# Patient Record
Sex: MALE | Race: WHITE | Employment: OTHER | URBAN - METROPOLITAN AREA
[De-identification: names, ages, dates, MRNs, and addresses within clinical notes are randomized per-mention and may not be internally consistent; named-entity substitution may affect disease eponyms.]

---

## 2018-10-17 ENCOUNTER — HOSPITAL ENCOUNTER (OUTPATIENT)
Dept: PREADMISSION TESTING | Age: 63
Discharge: HOME | End: 2018-10-17
Payer: COMMERCIAL

## 2018-10-17 ENCOUNTER — HOSPITAL ENCOUNTER (OUTPATIENT)
Dept: GENERAL RADIOLOGY | Age: 63
Discharge: HOME | End: 2018-10-17
Payer: COMMERCIAL

## 2018-10-17 VITALS — WEIGHT: 260 LBS | BODY MASS INDEX: 35.21 KG/M2 | HEIGHT: 72 IN

## 2018-10-17 LAB
ALBUMIN SERPL-MCNC: 3.9 G/DL (ref 3.4–5)
ALBUMIN/GLOB SERPL: 1.1 {RATIO} (ref 0.8–1.7)
ALP SERPL-CCNC: 77 U/L (ref 45–117)
ALT SERPL-CCNC: 30 U/L (ref 16–61)
ANION GAP SERPL CALC-SCNC: 12 MMOL/L (ref 3–18)
APPEARANCE UR: CLEAR
AST SERPL-CCNC: 17 U/L (ref 15–37)
BILIRUB SERPL-MCNC: 0.5 MG/DL (ref 0.2–1)
BILIRUB UR QL: NEGATIVE
BUN SERPL-MCNC: 27 MG/DL (ref 7–18)
BUN/CREAT SERPL: 25 (ref 12–20)
CALCIUM SERPL-MCNC: 9.4 MG/DL (ref 8.5–10.1)
CHLORIDE SERPL-SCNC: 101 MMOL/L (ref 100–108)
CO2 SERPL-SCNC: 24 MMOL/L (ref 21–32)
COLOR UR: YELLOW
CREAT SERPL-MCNC: 1.1 MG/DL (ref 0.6–1.3)
ERYTHROCYTE [DISTWIDTH] IN BLOOD BY AUTOMATED COUNT: 14.5 % (ref 11.6–14.5)
GLOBULIN SER CALC-MCNC: 3.4 G/DL (ref 2–4)
GLUCOSE SERPL-MCNC: 91 MG/DL (ref 74–99)
GLUCOSE UR STRIP.AUTO-MCNC: NEGATIVE MG/DL
HCT VFR BLD AUTO: 43.4 % (ref 36–48)
HGB BLD-MCNC: 14.8 G/DL (ref 13–16)
HGB UR QL STRIP: NEGATIVE
KETONES UR QL STRIP.AUTO: ABNORMAL MG/DL
LEUKOCYTE ESTERASE UR QL STRIP.AUTO: NEGATIVE
MCH RBC QN AUTO: 29.4 PG (ref 24–34)
MCHC RBC AUTO-ENTMCNC: 34.1 G/DL (ref 31–37)
MCV RBC AUTO: 86.3 FL (ref 74–97)
NITRITE UR QL STRIP.AUTO: NEGATIVE
PH UR STRIP: 5 [PH] (ref 5–8)
PLATELET # BLD AUTO: 301 K/UL (ref 135–420)
PMV BLD AUTO: 9.8 FL (ref 9.2–11.8)
POTASSIUM SERPL-SCNC: 4.2 MMOL/L (ref 3.5–5.5)
PROT SERPL-MCNC: 7.3 G/DL (ref 6.4–8.2)
PROT UR STRIP-MCNC: NEGATIVE MG/DL
RBC # BLD AUTO: 5.03 M/UL (ref 4.7–5.5)
SODIUM SERPL-SCNC: 137 MMOL/L (ref 136–145)
SP GR UR REFRACTOMETRY: 1.03 (ref 1–1.03)
UROBILINOGEN UR QL STRIP.AUTO: 0.2 EU/DL (ref 0.2–1)
WBC # BLD AUTO: 8.6 K/UL (ref 4.6–13.2)

## 2018-10-17 PROCEDURE — 85027 COMPLETE CBC AUTOMATED: CPT | Performed by: ORTHOPAEDIC SURGERY

## 2018-10-17 PROCEDURE — 71045 X-RAY EXAM CHEST 1 VIEW: CPT

## 2018-10-17 PROCEDURE — 93005 ELECTROCARDIOGRAM TRACING: CPT

## 2018-10-17 PROCEDURE — 80053 COMPREHEN METABOLIC PANEL: CPT | Performed by: ORTHOPAEDIC SURGERY

## 2018-10-17 PROCEDURE — 81003 URINALYSIS AUTO W/O SCOPE: CPT | Performed by: ORTHOPAEDIC SURGERY

## 2018-10-17 RX ORDER — CITALOPRAM 40 MG/1
40 TABLET, FILM COATED ORAL DAILY
COMMUNITY

## 2018-10-17 RX ORDER — SODIUM CHLORIDE, SODIUM LACTATE, POTASSIUM CHLORIDE, CALCIUM CHLORIDE 600; 310; 30; 20 MG/100ML; MG/100ML; MG/100ML; MG/100ML
125 INJECTION, SOLUTION INTRAVENOUS CONTINUOUS
Status: CANCELLED | OUTPATIENT
Start: 2018-10-26

## 2018-10-17 RX ORDER — LIDOCAINE 50 MG/G
1 PATCH TOPICAL AS NEEDED
COMMUNITY

## 2018-10-17 RX ORDER — ACETAMINOPHEN 325 MG/1
650 TABLET ORAL
COMMUNITY

## 2018-10-17 RX ORDER — OLMESARTAN MEDOXOMIL, AMLODIPINE AND HYDROCHLOROTHIAZIDE TABLET 40/5/25 MG 40; 5; 25 MG/1; MG/1; MG/1
1 TABLET ORAL DAILY
COMMUNITY

## 2018-10-17 RX ORDER — MONTELUKAST SODIUM 10 MG/1
10 TABLET ORAL DAILY
COMMUNITY

## 2018-10-17 RX ORDER — TRAZODONE HYDROCHLORIDE 50 MG/1
50 TABLET ORAL AS NEEDED
COMMUNITY

## 2018-10-17 RX ORDER — RIZATRIPTAN BENZOATE 10 MG/1
10 TABLET ORAL
COMMUNITY

## 2018-10-17 RX ORDER — PREGABALIN 150 MG/1
150 CAPSULE ORAL 2 TIMES DAILY
COMMUNITY

## 2018-10-17 RX ORDER — ONDANSETRON 4 MG/1
4 TABLET, ORALLY DISINTEGRATING ORAL
COMMUNITY

## 2018-10-17 RX ORDER — TIZANIDINE 4 MG/1
4 TABLET ORAL
COMMUNITY

## 2018-10-17 RX ORDER — TRIAMCINOLONE ACETONIDE 55 UG/1
2 SPRAY, METERED NASAL DAILY
COMMUNITY

## 2018-10-17 RX ORDER — MINERAL OIL
180 ENEMA (ML) RECTAL DAILY
COMMUNITY

## 2018-10-17 NOTE — PERIOP NOTES
Denies sleep apnea or any complications with anesthesia joselito prep reviewed does not meet criteria for special population

## 2018-10-20 LAB
ATRIAL RATE: 85 BPM
CALCULATED P AXIS, ECG09: 66 DEGREES
CALCULATED R AXIS, ECG10: 50 DEGREES
CALCULATED T AXIS, ECG11: 41 DEGREES
DIAGNOSIS, 93000: NORMAL
P-R INTERVAL, ECG05: 162 MS
Q-T INTERVAL, ECG07: 362 MS
QRS DURATION, ECG06: 80 MS
QTC CALCULATION (BEZET), ECG08: 430 MS
VENTRICULAR RATE, ECG03: 85 BPM

## 2018-10-24 NOTE — PERIOP NOTES
Spoke with Butch Covarrubias at Dr Shorty Summers office, she will inform Viji Lyons of CXR results.

## 2018-10-25 ENCOUNTER — ANESTHESIA EVENT (OUTPATIENT)
Dept: SURGERY | Age: 63
End: 2018-10-25
Payer: COMMERCIAL

## 2018-10-25 NOTE — H&P
Patient Name:  Marisa Johnson  [facility]) YOB: 1955 Chief Complaint:  Left knee pain. History of Chief Complaint:  Lon Acevedo comes in today for evaluation of his left knee pain. He was previously evaluated and noted to have focal symptoms in the lateral aspect of the knee and difficulty with motion with flexion and extension and recurrent mechanical complaints at the knee. He is noted to have severe arthritic change in the left hip that affects his ambulation and stability of his lower extremity as well but upon last evaluation he was concerned about these findings and we had recommended continuing plans for further evaluation of the knee and had set up MRI. Today he comes in for followup and he continues to focus his pain to the lateral aspect of the knee with motion and rotation. His symptoms seem to be intermittent but when it catches, it is difficult for him to put any significant weight on the knee, limiting his ambulation and his activity in general. 
 
Past Medical/Surgical History:   
Disease/Disorder Type Date Side Surgery Date Side Comment Adenoidectomy Tonsillectomy Allergies, environmental         
Hypertension Arthroscopy knee 03/05/2014 left Gastric hemorrhage due to Dieulafoy lesion of stomach    Repair 09/2015 Headache, migraine  1999 Depression  2005 Allergies:   
Ingredient Reaction Medication Name Comment NSAIDS (NON-STEROIDAL ANTI-INFLAMMATORY DRUG) Current Medications:   
Medication Directions Maxalt 10 mg tablet PRN Singulair 10 mg tablet take 1 tablet by oral route  every day Allegra Allergy   
ondansetron HCl 4 mg tablet take 2 tablet by oral route 2 times every day Chlor-Trimeton 4 mg tablet take 1 tablet by oral route  every 4 hours as needed  
bupropion HCl  mg tablet,12 hr sustained-release   
citalopram 40 mg tablet   
hydrocodone 10 mg-acetaminophen 325 mg tablet olmesartan 40 mg-amlodipine 10 mg-hydrochlorothiazide 25 mg tablet Lidoderm 5 % topical patch APPLY 1 PATCH BY TRANSDERMAL ROUTE EVERY DAY (MAY WEAR UP TO 12HOURS.)  
tizanidine 4 mg tablet take 1 tablet by oral route  every 8 hours as needed not to exceed 3 doses in 24 hours Lorzone 750 mg tablet take 1 tablet by oral route 2 times every day and Tizanidine at bedtime. Do not take within 8 hours of the tizanidine Lyrica 150 mg capsule Take 1 tablet by oral route three times daily Social History: SMOKING Status Tobacco Type Units Per Day Yrs Used Never smoker ALCOHOL There is a history of alcohol use. consumed occasionally. Family History:   
Disease Detail Family Member Age Cause of Death Comments No relevant family history Review of Systems:    Pertinent positives include joint pain and joint stiffness. Pertinent negatives include chills, fever and numbness/tingling. Vitals: 
Date BP Pulse Temp (F) Resp. (per min.) Height (Total in.) Weight (lbs.) BMI  
08/30/2018 172/82 94   72.00  36.62 Physical Examination: Psychiatric/General:  No acute distress; pleasant and accommodating. HEENT:  Moist mucous membranes intact. Lymphatic:  Neck is supple with no lymphadenopathy upon evaluation. Respiratory:   Equal bilateral chest wall movement with inspiration; no wheezes or strider audible. Cardiovascular:    Regular rate and rhythm, as noted by upper extremity pulses. Musculoskeletal: On evaluation of the left knee, he is able to flex and extend with tenderness upon palpation, diffuse, mostly focused to the lateral aspect of the knee and the lateral joint line with reproduced symptoms and positive Mahin's focused to the lateral aspect of the joint. Gross motor is intact throughout the lower extremities. Skin is without ulceration or lesion. Standing and evaluation of gait is antalgic on the lateral aspect as well as with poor progression.   With forward flexion he has reproduced symptoms in the anterior portion of the groin with flexion, extension and rotation. Data/Radiograph Evaluation:    MRI is reviewed and notes underlying arthritic change, mostly patellofemoral and mild changes in the medial and lateral compartments, very significant lateral meniscal tear and also noted. Assessment:   Lower extremity complaints with focal symptoms in the lateral joint line which correlates with findings on MRI. He does have significant arthrosis of his hip which undoubtedly can give him knee pain as well but at this point. Recommendation:   I think we will focus on the knee to improve his function so we can move forward with additional intervention of the hip in the future if necessary. The risks and benefits were reviewed. We will continue with plans to schedule arthroscopy of the left knee.          
 
Alvaro Chun, DO

## 2018-10-25 NOTE — DISCHARGE INSTRUCTIONS
[facility]  Dr. Darshan Mallory Post-Operative Instructions Knee Arthroscopy Scope    Diet:  1. Begin with liquids and light foods such as Jell-O and soups. 2. Advance as tolerated to your regular diet if not nauseated. First 24 hours:  1. Be in the care of a responsible adult. 2. Do not drive or operate machinery. 3. Do not drink alcoholic beverages. Activities:  1. Elevate the limb above hip and preferably above chest for 48 hours. 2. Ice should be applied to the knee in a waterproof bag for 15-30 minutes each hour while awake for first 48 hours. 3. Normal walking is encouraged after 2 days. 4. Do not engage in activities that increase your pain such as stair-climbing or prolonged standing. 5. Return to work depends on your type of employment. 6.  Follow-up with Dr. Marian Frausto in 7-10 days post-operatively. Exercise:  1. Begin exercises the day of surgery for both legs and repeat hourly while awake:  *Quad sets (tightening the thigh muscles)  *? Straight leg raises (lift and hold 12-18 off bed or floor for 8 count)  *? Vigorous ankle pumps (toes towards and away from head)  2. Your routine exercises generally can be started one week after surgery as long as you can bend the knee freely to at least 90 degrees. Wound Care:  1. Maintain your postoperative dressing. Loosen the ACE wrap if swelling of the foot or ankle occurs. 2. Remove your surgical dressing on the second post op day. Cover the wounds with Band-Aids and re-wrap the ACE bandage until swelling of knee is gone. To maintain good circulation, do not wrap too tightly. 3. Keep the surgical incisions dry until your sutures are removed when you see your doctor. Use a plastic bag with rubber bands to cover the limb during showers. Immersing the limb in water is to be avoided. Medications:  1. Strong oral narcotic pain medications have been prescribed for the first few days. Use only as directed.  No pain medication is capable of taking away all the pain. Taking your pills at regular intervals will give you the best chance of having less pain. 2. If you need a refill PLEASE PLAN AHEAD. Call our office during regular hours (8-5). 3. Do not combine with alcoholic beverages. 4. Be careful as you walk, climb stairs or drive as mild dizziness is not unusual.  5. Do not take medications that have not been prescribed by your surgeon. 6. You may switch to over the counter pain medication of your choice as you become more comfortable. WHEN TO CALL YOUR SURGEON:  1. Significant swelling or any new numbness in the limb that was operated on  2. Unrelenting pain  3. Fever or Chills  4. Redness around incisions  5. Color change in foot or toes  6. Continuous drainage or bleeding from wounds (a small amount of drainage is expected)  7. Any other worrisome condition    WHEN TO CALL YOUR REGULAR DOCTOR:  1. Flare up of any of your regular medical conditions    WHEN TO CALL 911:  1. Chest Pain  2. Shortness of Breath  3. Any other acute serious condition    CALL THE OFFICE:   If you have severe pain unrelieved by the medications;   If you have a fever of 101.0°F or greater;    If you notice excessive swelling, redness, or persistent drainage from the incision or IV site; [facility] office number is [phone] from 8:00am to 5:00pm Monday through Friday. After 5:00pm, on weekends, or holidays, please leave a message with our answering service and the doctor on-call will get back to you shortly. Cira Rogers

## 2018-10-26 ENCOUNTER — ANESTHESIA (OUTPATIENT)
Dept: SURGERY | Age: 63
End: 2018-10-26
Payer: COMMERCIAL

## 2018-10-26 ENCOUNTER — HOSPITAL ENCOUNTER (OUTPATIENT)
Age: 63
Setting detail: OUTPATIENT SURGERY
Discharge: HOME | End: 2018-10-26
Attending: ORTHOPAEDIC SURGERY | Admitting: ORTHOPAEDIC SURGERY
Payer: COMMERCIAL

## 2018-10-26 VITALS
BODY MASS INDEX: 36.99 KG/M2 | OXYGEN SATURATION: 95 % | RESPIRATION RATE: 18 BRPM | DIASTOLIC BLOOD PRESSURE: 65 MMHG | HEIGHT: 72 IN | TEMPERATURE: 98.3 F | HEART RATE: 80 BPM | WEIGHT: 273.06 LBS | SYSTOLIC BLOOD PRESSURE: 148 MMHG

## 2018-10-26 DIAGNOSIS — M23.322 MEDIAL MENISCUS, POSTERIOR HORN DERANGEMENT, LEFT: Primary | ICD-10-CM

## 2018-10-26 PROCEDURE — 77030018835 HC SOL IRR LR ICUM -A: Performed by: ORTHOPAEDIC SURGERY

## 2018-10-26 PROCEDURE — 77030036563 HC WRP CLD THER KNE S2SG -B: Performed by: ORTHOPAEDIC SURGERY

## 2018-10-26 PROCEDURE — 77030034478 HC TU IRR ARTHRO PT ARTH -B: Performed by: ORTHOPAEDIC SURGERY

## 2018-10-26 PROCEDURE — 77030020782 HC GWN BAIR PAWS FLX 3M -B: Performed by: ORTHOPAEDIC SURGERY

## 2018-10-26 PROCEDURE — 74011250636 HC RX REV CODE- 250/636: Performed by: ORTHOPAEDIC SURGERY

## 2018-10-26 PROCEDURE — 74011250637 HC RX REV CODE- 250/637: Performed by: ANESTHESIOLOGY

## 2018-10-26 PROCEDURE — 74011250636 HC RX REV CODE- 250/636

## 2018-10-26 PROCEDURE — 76210000026 HC REC RM PH II 1 TO 1.5 HR: Performed by: ORTHOPAEDIC SURGERY

## 2018-10-26 PROCEDURE — 77030022877 HC TU IRR ARTHRO PMP ARTH -B: Performed by: ORTHOPAEDIC SURGERY

## 2018-10-26 PROCEDURE — 74011000250 HC RX REV CODE- 250: Performed by: ORTHOPAEDIC SURGERY

## 2018-10-26 PROCEDURE — 77030006884 HC BLD SHV INCIS S&N -B: Performed by: ORTHOPAEDIC SURGERY

## 2018-10-26 PROCEDURE — 76210000006 HC OR PH I REC 0.5 TO 1 HR: Performed by: ORTHOPAEDIC SURGERY

## 2018-10-26 PROCEDURE — 76060000032 HC ANESTHESIA 0.5 TO 1 HR: Performed by: ORTHOPAEDIC SURGERY

## 2018-10-26 PROCEDURE — 77030012508 HC MSK AIRWY LMA AMBU -A: Performed by: ANESTHESIOLOGY

## 2018-10-26 PROCEDURE — 77030032490 HC SLV COMPR SCD KNE COVD -B: Performed by: ORTHOPAEDIC SURGERY

## 2018-10-26 PROCEDURE — 77030002933 HC SUT MCRYL J&J -A: Performed by: ORTHOPAEDIC SURGERY

## 2018-10-26 PROCEDURE — 76010000138 HC OR TIME 0.5 TO 1 HR: Performed by: ORTHOPAEDIC SURGERY

## 2018-10-26 PROCEDURE — 74011000250 HC RX REV CODE- 250

## 2018-10-26 RX ORDER — SODIUM CHLORIDE, SODIUM LACTATE, POTASSIUM CHLORIDE, CALCIUM CHLORIDE 600; 310; 30; 20 MG/100ML; MG/100ML; MG/100ML; MG/100ML
125 INJECTION, SOLUTION INTRAVENOUS CONTINUOUS
Status: DISCONTINUED | OUTPATIENT
Start: 2018-10-26 | End: 2018-10-26 | Stop reason: HOSPADM

## 2018-10-26 RX ORDER — HYDROCODONE BITARTRATE AND ACETAMINOPHEN 5; 325 MG/1; MG/1
1 TABLET ORAL AS NEEDED
Status: DISCONTINUED | OUTPATIENT
Start: 2018-10-26 | End: 2018-10-26 | Stop reason: HOSPADM

## 2018-10-26 RX ORDER — SODIUM CHLORIDE 0.9 % (FLUSH) 0.9 %
5-10 SYRINGE (ML) INJECTION AS NEEDED
Status: DISCONTINUED | OUTPATIENT
Start: 2018-10-26 | End: 2018-10-26 | Stop reason: HOSPADM

## 2018-10-26 RX ORDER — GLYCOPYRROLATE 0.2 MG/ML
INJECTION INTRAMUSCULAR; INTRAVENOUS AS NEEDED
Status: DISCONTINUED | OUTPATIENT
Start: 2018-10-26 | End: 2018-10-26 | Stop reason: HOSPADM

## 2018-10-26 RX ORDER — PROPOFOL 10 MG/ML
INJECTION, EMULSION INTRAVENOUS AS NEEDED
Status: DISCONTINUED | OUTPATIENT
Start: 2018-10-26 | End: 2018-10-26 | Stop reason: HOSPADM

## 2018-10-26 RX ORDER — KETOROLAC TROMETHAMINE 30 MG/ML
INJECTION, SOLUTION INTRAMUSCULAR; INTRAVENOUS AS NEEDED
Status: DISCONTINUED | OUTPATIENT
Start: 2018-10-26 | End: 2018-10-26 | Stop reason: HOSPADM

## 2018-10-26 RX ORDER — DEXAMETHASONE SODIUM PHOSPHATE 4 MG/ML
INJECTION, SOLUTION INTRA-ARTICULAR; INTRALESIONAL; INTRAMUSCULAR; INTRAVENOUS; SOFT TISSUE AS NEEDED
Status: DISCONTINUED | OUTPATIENT
Start: 2018-10-26 | End: 2018-10-26 | Stop reason: HOSPADM

## 2018-10-26 RX ORDER — ONDANSETRON 2 MG/ML
INJECTION INTRAMUSCULAR; INTRAVENOUS AS NEEDED
Status: DISCONTINUED | OUTPATIENT
Start: 2018-10-26 | End: 2018-10-26 | Stop reason: HOSPADM

## 2018-10-26 RX ORDER — HYDROCODONE BITARTRATE AND ACETAMINOPHEN 10; 325 MG/1; MG/1
1 TABLET ORAL
COMMUNITY

## 2018-10-26 RX ORDER — FENTANYL CITRATE 50 UG/ML
INJECTION, SOLUTION INTRAMUSCULAR; INTRAVENOUS AS NEEDED
Status: DISCONTINUED | OUTPATIENT
Start: 2018-10-26 | End: 2018-10-26 | Stop reason: HOSPADM

## 2018-10-26 RX ORDER — FLUMAZENIL 0.1 MG/ML
0.2 INJECTION INTRAVENOUS
Status: DISCONTINUED | OUTPATIENT
Start: 2018-10-26 | End: 2018-10-26 | Stop reason: HOSPADM

## 2018-10-26 RX ORDER — HYDROCODONE BITARTRATE AND ACETAMINOPHEN 5; 325 MG/1; MG/1
1 TABLET ORAL
Qty: 21 TAB | Refills: 0 | Status: SHIPPED | OUTPATIENT
Start: 2018-10-26

## 2018-10-26 RX ORDER — DIPHENHYDRAMINE HCL 25 MG
25 CAPSULE ORAL
COMMUNITY

## 2018-10-26 RX ORDER — MIDAZOLAM HYDROCHLORIDE 1 MG/ML
INJECTION, SOLUTION INTRAMUSCULAR; INTRAVENOUS AS NEEDED
Status: DISCONTINUED | OUTPATIENT
Start: 2018-10-26 | End: 2018-10-26 | Stop reason: HOSPADM

## 2018-10-26 RX ORDER — LIDOCAINE HYDROCHLORIDE 20 MG/ML
INJECTION, SOLUTION EPIDURAL; INFILTRATION; INTRACAUDAL; PERINEURAL AS NEEDED
Status: DISCONTINUED | OUTPATIENT
Start: 2018-10-26 | End: 2018-10-26 | Stop reason: HOSPADM

## 2018-10-26 RX ORDER — FENTANYL CITRATE 50 UG/ML
50 INJECTION, SOLUTION INTRAMUSCULAR; INTRAVENOUS
Status: DISCONTINUED | OUTPATIENT
Start: 2018-10-26 | End: 2018-10-26 | Stop reason: HOSPADM

## 2018-10-26 RX ORDER — NALOXONE HYDROCHLORIDE 0.4 MG/ML
0.4 INJECTION, SOLUTION INTRAMUSCULAR; INTRAVENOUS; SUBCUTANEOUS AS NEEDED
Status: DISCONTINUED | OUTPATIENT
Start: 2018-10-26 | End: 2018-10-26 | Stop reason: HOSPADM

## 2018-10-26 RX ADMIN — KETOROLAC TROMETHAMINE 30 MG: 30 INJECTION, SOLUTION INTRAMUSCULAR; INTRAVENOUS at 09:46

## 2018-10-26 RX ADMIN — GLYCOPYRROLATE 0.2 MG: 0.2 INJECTION INTRAMUSCULAR; INTRAVENOUS at 09:24

## 2018-10-26 RX ADMIN — HYDROCODONE BITARTRATE AND ACETAMINOPHEN 1 TABLET: 5; 325 TABLET ORAL at 11:04

## 2018-10-26 RX ADMIN — FENTANYL CITRATE 50 MCG: 50 INJECTION, SOLUTION INTRAMUSCULAR; INTRAVENOUS at 09:32

## 2018-10-26 RX ADMIN — LIDOCAINE HYDROCHLORIDE 100 MG: 20 INJECTION, SOLUTION EPIDURAL; INFILTRATION; INTRACAUDAL; PERINEURAL at 09:32

## 2018-10-26 RX ADMIN — MIDAZOLAM HYDROCHLORIDE 2 MG: 1 INJECTION, SOLUTION INTRAMUSCULAR; INTRAVENOUS at 09:24

## 2018-10-26 RX ADMIN — FENTANYL CITRATE 25 MCG: 50 INJECTION, SOLUTION INTRAMUSCULAR; INTRAVENOUS at 09:24

## 2018-10-26 RX ADMIN — PROPOFOL 200 MG: 10 INJECTION, EMULSION INTRAVENOUS at 09:32

## 2018-10-26 RX ADMIN — SODIUM CHLORIDE, SODIUM LACTATE, POTASSIUM CHLORIDE, AND CALCIUM CHLORIDE 125 ML/HR: 600; 310; 30; 20 INJECTION, SOLUTION INTRAVENOUS at 07:52

## 2018-10-26 RX ADMIN — Medication 3 G: at 09:39

## 2018-10-26 RX ADMIN — DEXAMETHASONE SODIUM PHOSPHATE 4 MG: 4 INJECTION, SOLUTION INTRA-ARTICULAR; INTRALESIONAL; INTRAMUSCULAR; INTRAVENOUS; SOFT TISSUE at 09:46

## 2018-10-26 RX ADMIN — FENTANYL CITRATE 25 MCG: 50 INJECTION, SOLUTION INTRAMUSCULAR; INTRAVENOUS at 09:40

## 2018-10-26 RX ADMIN — ONDANSETRON 4 MG: 2 INJECTION INTRAMUSCULAR; INTRAVENOUS at 09:46

## 2018-10-26 NOTE — ANESTHESIA PREPROCEDURE EVALUATION
Anesthetic History Pertinent negatives: No PONV Review of Systems / Medical History Patient summary reviewed, nursing notes reviewed and pertinent labs reviewed Pulmonary Asthma (allergy induced asthma; rare albuterol use) : well controlled Pertinent negatives: No sleep apnea and smoker Neuro/Psych Psychiatric history (h/o depression and currently anxious) Cardiovascular Hypertension (did not take meds this AM): well controlled Pertinent negatives: No past MI and angina Exercise tolerance: >4 METS 
  
GI/Hepatic/Renal 
  
 
 
 
Hiatal hernia Pertinent negatives: No GERD Comments: Dieulafoy lesion of stomach s/p repair with no recurrent bleeding since repair. Endo/Other Obesity and arthritis Pertinent negatives: No diabetes Other Findings Physical Exam 
 
Airway Mallampati: II 
TM Distance: 4 - 6 cm Neck ROM: normal range of motion Comments: beard Cardiovascular Rhythm: regular Rate: normal 
 
 
 
 Dental 
No notable dental hx Pulmonary Breath sounds clear to auscultation Abdominal 
GI exam deferred Other Findings Anesthetic Plan ASA: 2 Anesthesia type: general 
 
 
 
 
Induction: Intravenous Anesthetic plan and risks discussed with: Patient Plan GA/LMA. Pt acknowledges risks and agrees to proceed. All questions answered. Consent signed.

## 2018-10-26 NOTE — ANESTHESIA POSTPROCEDURE EVALUATION
Post-Anesthesia Evaluation and Assessment Cardiovascular Function/Vital Signs Visit Vitals /79 Pulse 94 Temp 36.9 °C (98.5 °F) Resp 24 Ht 6' (1.829 m) Wt 123.9 kg (273 lb 1 oz) SpO2 96% BMI 37.03 kg/m² Patient is status post Procedure(s): LEFT KNEE ARTHROSCOPY/PARTIAL LATERAL MENISECTOMY. Nausea/Vomiting: Controlled. Postoperative hydration reviewed and adequate. Pain: 
Pain Scale 1: Numeric (0 - 10) (10/26/18 1036) Pain Intensity 1: 0 (10/26/18 1036) Managed. Neurological Status:  
Neuro (WDL): Within Defined Limits (10/26/18 0727) At baseline. Mental Status and Level of Consciousness: Arousable. Pulmonary Status:  
O2 Device: Room air (10/26/18 1036) Adequate oxygenation and airway patent. Complications related to anesthesia: None Post-anesthesia assessment completed. No concerns. Patient has met all discharge requirements. Signed By: Lloyd Farfan MD  
 October 26, 2018

## 2018-10-26 NOTE — BRIEF OP NOTE
BRIEF OPERATIVE NOTE Date of Procedure: 10/26/2018 Preoperative Diagnosis: LEFT KNEE LATERAL MENISCAL TEAR Postoperative Diagnosis: LEFT KNEE LATERAL MENISCAL TEAR Procedure(s): LEFT KNEE ARTHROSCOPY/PARTIAL LATERAL MENISECTOMY Surgeon(s) and Role: Sebas Burnett, DO - Primary Surgical Assistant: erlinda 
 
Surgical Staff: 
Circ-1: Jelena Herrera Tech-Relief: Elijah Weiss Scrub RN-1: Zoya Olsen RN Event Time In Time Out Incision Start [phone] Incision Close 0957 Anesthesia: General  
Estimated Blood Loss: 10 ml 
IVF 1000 ml Specimens: * No specimens in log * Findings: medial meniscal tear CMP Complications: none Implants: * No implants in log *

## 2018-10-26 NOTE — OP NOTES
OPERATIVE NOTE    Patient: Balaji Cornell MRN: [id number]  SSN: [id number]    YOB: 1955  Age: 58 y.o. Sex: male      Indications: This is a 58y.o. year-old male who presents with knee pain. He was positive for meniscal tear on MRI. The patient was admitted for surgery as conservative measures have failed. Date of Procedure: 10/26/2018     Preoperative Diagnosis: LEFT KNEE LATERAL MENISCAL TEAR    Postoperative Diagnosis: LEFT KNEE LATERAL MENISCAL TEAR      Procedure: Procedure(s):  LEFT KNEE ARTHROSCOPY/PARTIAL LATERAL MENISECTOMY    Surgeon(s) and Role:     * Gunjan Gonzalez, DO - Primary    Anesthesia: general LMA    Estimated Blood Loss: 10ml  IVF 1000 ml    Specimens: * No specimens in log *     Drains: none    Implants: * No implants in log *    Complications: None; patient tolerated the procedure well. Procedure: The patient was greeted by anesthesia and taken to the operative suite, where she underwent general endotracheal anesthesia. The patient was positioned in the supine position on a standard orthopedic table. The left leg was secured with a surgical assist leg ordaz and sterilely prepped and draped in standard fashion. Standard inferomedial and inferolateral portals were made with a 15-blade scalpel. A 30-degree arthroscope was placed through the lateral portal into the suprapatellar  Pouch. Evaluation of the patellofemoral compartment showed grade 3 changes of the cartilage surface. Evaluation of the medial compartment showed grade 3 changes of the cartilgenus surface. The anterior and posterior cruciate ligament were found to be intact. Grade 1 changes were noted over the lateral compartment. The menesci were evaluated and probed. The medial compartment demonstrated a horizontal cleavage tear. The lateral compartment demonstrated no significant abnormalities.   Arthroscopic meniscectomy was performed with a shaver where appropriated until no loose or unstable cartilage remained upon probing. A chondroplasty was performed over the articular surface of the medial femoral condyle. Additional procedures included chondroplasty patella. The knee was irrigated with the remainder of the arthroscopic lavage and injected with 30 ml of .25% Marcaine with Epinephrine and 4 mg of Morphine sulfate. The incisions were reapproximated with 2-0 nylon suture in horizontal mattress fashion x 2. A soft sterile dressing and an ace wrap were placed. The patient recovered from anesthesia and was transferred to the post-anesthesia care unit in stable condition.

## 2018-10-26 NOTE — PERIOP NOTES
TRANSFER - OUT REPORT: 
 
Verbal report given to JOHN Nguyễn(name) on Thiago Sofia  being transferred to phase 2(unit) for routine post - op Report consisted of patients Situation, Background, Assessment and  
Recommendations(SBAR). Information from the following report(s) SBAR, Kardex, OR Summary, Procedure Summary, Intake/Output and MAR was reviewed with the receiving nurse. Lines:  
Peripheral IV 10/26/18 Posterior;Right Hand (Active) Site Assessment Clean, dry, & intact 10/26/2018 10:13 AM  
Phlebitis Assessment 0 10/26/2018 10:13 AM  
Infiltration Assessment 0 10/26/2018 10:13 AM  
Dressing Status Clean, dry, & intact 10/26/2018 10:13 AM  
Dressing Type Transparent;Tape 10/26/2018 10:13 AM  
Hub Color/Line Status Pink; Infusing 10/26/2018 10:13 AM  
Alcohol Cap Used No 10/26/2018  7:51 AM  
  
 
Opportunity for questions and clarification was provided. Patient transported with: 
 Registered Nurse Tech

## 2018-10-26 NOTE — INTERVAL H&P NOTE
H&P Update: 
Polina Norwood was seen and examined. History and physical has been reviewed. The patient has been examined. There have been no significant clinical changes since the completion of the originally dated History and Physical. 
Patient identified by surgeon; surgical site was confirmed by patient and surgeon. Plan for left knee arthroscopy and debridement meniscus Signed By: Kwesi Segura DO  October 26, 2018 9:23 AM

## (undated) DEVICE — UNDERCAST PADDING: Brand: DEROYAL

## (undated) DEVICE — SOLUTION IRRIG 3000ML LAC R FLX CONT

## (undated) DEVICE — STERILE POLYISOPRENE POWDER-FREE SURGICAL GLOVES: Brand: PROTEXIS

## (undated) DEVICE — PACK PROCEDURE SURG KNEE ARTHSCP CUST

## (undated) DEVICE — KENDALL SCD EXPRESS SLEEVES, KNEE LENGTH, MEDIUM: Brand: KENDALL SCD

## (undated) DEVICE — MASTISOL ADHESIVE LIQ 2/3ML

## (undated) DEVICE — GOWN,SIRUS,NONRNF,SETINSLV,2XL,18/CS: Brand: MEDLINE

## (undated) DEVICE — (D)PREP SKN CHLRAPRP APPL 26ML -- CONVERT TO ITEM 371833

## (undated) DEVICE — (D)STRIP SKN CLSR 0.5X4IN WHT --

## (undated) DEVICE — TUBE IRRIG L8IN LNG PT W/ CONN FOR PMP SYS REDEUCE

## (undated) DEVICE — TUBING PMP L8FT LNG W/ CONN FOR AR-6400 REDEUCE

## (undated) DEVICE — SUT MONOCRYL PLUS UD 3-0 --

## (undated) DEVICE — 3.5 MM INCISOR STRAIGHT BLADES,                                    POWER/EP-1, MEDIUM GRAY, PACKAGED 6                                    PER BOX, STERILE